# Patient Record
Sex: MALE | URBAN - METROPOLITAN AREA
[De-identification: names, ages, dates, MRNs, and addresses within clinical notes are randomized per-mention and may not be internally consistent; named-entity substitution may affect disease eponyms.]

---

## 2017-01-19 ENCOUNTER — OFFICE VISIT (OUTPATIENT)
Dept: SURGERY | Facility: CLINIC | Age: 57
End: 2017-01-19

## 2017-01-19 ENCOUNTER — TELEPHONE (OUTPATIENT)
Dept: SURGERY | Facility: CLINIC | Age: 57
End: 2017-01-19

## 2017-01-19 VITALS
DIASTOLIC BLOOD PRESSURE: 80 MMHG | BODY MASS INDEX: 30.48 KG/M2 | WEIGHT: 230 LBS | HEART RATE: 80 BPM | HEIGHT: 73 IN | RESPIRATION RATE: 14 BRPM | SYSTOLIC BLOOD PRESSURE: 140 MMHG

## 2017-01-19 DIAGNOSIS — M54.81 BILATERAL OCCIPITAL NEURALGIA: Primary | ICD-10-CM

## 2017-01-19 DIAGNOSIS — M54.81 OCCIPITAL NEURITIS: Primary | ICD-10-CM

## 2017-01-19 DIAGNOSIS — M47.812 SPONDYLOSIS OF CERVICAL REGION WITHOUT MYELOPATHY OR RADICULOPATHY: ICD-10-CM

## 2017-01-19 DIAGNOSIS — M47.812 FACET ARTHROPATHY, CERVICAL: ICD-10-CM

## 2017-01-19 DIAGNOSIS — M47.819 SPONDYLOSIS WITHOUT MYELOPATHY: ICD-10-CM

## 2017-01-19 PROCEDURE — 99212 OFFICE O/P EST SF 10 MIN: CPT | Performed by: PHYSICIAN ASSISTANT

## 2017-01-19 NOTE — PATIENT INSTRUCTIONS
Refill policies:    • Allow 2 business days for refills; controlled substances may take longer.   • Contact your pharmacy at least 5 days prior to running out of medication and have them send an electronic request or submit request through the “request re your physician has recommended that you have a procedure or additional testing performed. [facility] will contact your insurance carrier to obtain pre-certification or prior authorization.     Unfortunately, STACIE has seen an increas

## 2017-01-19 NOTE — TELEPHONE ENCOUNTER
Please call patient to set up occipital and facet injections. Patient is considering having injections in March.

## 2017-01-19 NOTE — TELEPHONE ENCOUNTER
Spoke with patient and scheduled for recommended procedures. Patient scheduled with sedation, pre-procedural instructions reviewed and sent to home address. No further needs at this time.     [address]  PRE-PROCEDURE INSTRUCTIONS WITH IV S ? Others 5 days  ? Excedrin (with aspirin) 7 days  ? Plavix (Clopidogrel)  ? Epidural ____ - 10 days  ? Others 7 days   NSAIDs: 24 hours    ?  Ibuprofen (Motrin, Advil, Vicoprofen), Naproxen (Naprosyn, Aleve), Piroxcam (Feldene), Meloxicam (Mobic), Oxaprozi

## 2017-01-19 NOTE — PROGRESS NOTES
[facility]  8/9/1960      HISTORY OF PRESENT ILLNESS:61year old here in follow up. He continues with neck pain. Bilateral occipital pain. Since his last visit he has had no treatments or injections. Still continues with bilateral occipital pain.   He Moderate neck pain on extension. Nontender on flexion. tender over occiput bilaterally. Loss of cervical Lordosis. Sensation to light touch is intact in both arms. Motor:5/5. Negative Nicholas's. Normal strength in the lower extremities.  limited rotation

## 2017-01-23 ENCOUNTER — TELEPHONE (OUTPATIENT)
Dept: NEUROLOGY | Facility: CLINIC | Age: 57
End: 2017-01-23

## 2017-01-23 NOTE — TELEPHONE ENCOUNTER
Spoke to Landon at [facility], codes [id number] are valid and billable, no prior authorization or predetermination required.  Call reference: [id number]  Call time 53:06

## 2017-02-27 ENCOUNTER — TELEPHONE (OUTPATIENT)
Dept: SURGERY | Facility: CLINIC | Age: 57
End: 2017-02-27

## 2017-02-27 NOTE — TELEPHONE ENCOUNTER
Pt informed pain procedures on 3/6, 13 and 20/17 canceled at this time. Instructed to call insurance carrier to determine other pain management physicians approved in pts plan. Verbalized understanding.

## 2018-05-29 ENCOUNTER — TELEPHONE (OUTPATIENT)
Dept: SURGERY | Facility: CLINIC | Age: 58
End: 2018-05-29

## 2018-05-29 NOTE — TELEPHONE ENCOUNTER
Request for any & all records & bills from 01/01/16 to present received, along w/check #[id number] of $27.37. Original request & check sent to [employer], copy sent to scanning.

## (undated) NOTE — Clinical Note
01/19/2017      [address]  PRE-PROCEDURE INSTRUCTIONS WITH IV SEDATION    Appointment Date: 3/6/17, 3/13/17, 3/20/17   Arrival Time: 10:15AM for all three appointments  Procedure Time: 11:15AM for all three appointments    Prior to the pr ? Ibuprofen (Motrin, Advil, Vicoprofen), Naproxen (Naprosyn, Aleve), Piroxcam (Feldene), Meloxicam (Mobic), Oxaprozin (Daypro), Diclofenac (Voltaren),  Indomethacin (Indocin), Etodolac (Lodine), Nabumetone (Relafen)                      HERBAL SUPPLEMENTS

## (undated) NOTE — MR AVS SNAPSHOT
[facility], [place] [id number] [facility], [address] [place], [place] [id number]               Thank you for choosing us for your health care visit with Dinesh Baeza MD.  We are glad to serve you and happy to provide you with this ? Patient must present photo ID at time of . If a designated family member will be picking up prescription, office must be given name of individual in advance and they must present an ID as well. ?  The name of the person picking up your prescripti This list is accurate as of: 1/19/17  2:37 PM.  Always use your most recent med list.                Alum Hydroxide-Mag Trisilicate 30-62 MG Chew   Chew 1 tablet by mouth nightly.    Commonly known as:  GAVISCON           aspirin 81 MG Tabs   Take 81 mg by day (2.4 g sodium or 6 g sodium chloride)   Aerobic physical activity Regular aerobic physical activity (e.g., brisk walking, light jogging, cycling, swimming, etc.) for a goal of at least 150 minutes per week.    Moderation of alcohol consumption Men: Jaylan Chung